# Patient Record
Sex: FEMALE | Race: BLACK OR AFRICAN AMERICAN | NOT HISPANIC OR LATINO | Employment: OTHER | URBAN - METROPOLITAN AREA
[De-identification: names, ages, dates, MRNs, and addresses within clinical notes are randomized per-mention and may not be internally consistent; named-entity substitution may affect disease eponyms.]

---

## 2020-08-20 ENCOUNTER — TELEPHONE (OUTPATIENT)
Dept: HEMATOLOGY/ONCOLOGY | Facility: CLINIC | Age: 80
End: 2020-08-20

## 2020-08-24 NOTE — TELEPHONE ENCOUNTER
Patient's daughter had requested assistance in transferring mother's care from [place] to [place]. Mother will be moving in with daughter Karina, and is currently receiving Opdivo for squamous cell lung cancer at [facility].     Working on record retrieval, clinic notes obtained from Dr. Collier's office but all path, radiology are at [facility] and [facility], which has separate medical record dept. Request sent to these locations for stat receipt of records.     Per Karina patient has medicare but lost her insurance card. Utilized social security number to determine that patient's medicare plan is specific to [place], will not be accepted here. Spoke to [place] medicare office who states she can transfer care to another [place]-accepted medicare program, and she will be automatically de-enrolled from [place]'s program. Per social work, best contact to assist with this is Radha Savage at [facility] office. Left Radha a message requesting call back.     Called Karina and reported all of the above and what we are waiting on regarding her mother's care. Karina has returned to [place] and her son is staying with patient in [place] until she can come here. She has my direct number, encouraged her to call any time to check in but that I would be in touch as soon as possible.

## 2020-08-27 ENCOUNTER — TELEPHONE (OUTPATIENT)
Dept: HEMATOLOGY/ONCOLOGY | Facility: CLINIC | Age: 80
End: 2020-08-27

## 2020-08-27 NOTE — TELEPHONE ENCOUNTER
Called patient's daughter after speaking with Radha at the [facility] about insurance needs. Per Radha daughter can call her and she will connect her with an agent that can help set her up with appropriate insurance. Left my number and encouraged her to call me back to discuss.     Also emailed daughter Radha's info and my contact info.

## 2020-09-16 ENCOUNTER — TELEPHONE (OUTPATIENT)
Dept: HEMATOLOGY/ONCOLOGY | Facility: CLINIC | Age: 80
End: 2020-09-16

## 2020-09-16 NOTE — TELEPHONE ENCOUNTER
Called patient's daughter to update on options for care now that patient is in [place]. They are working on [employer] Medicare, but patient has been recommended to go into hospice by previous treating physician, and they would like to speak to a physician about this. Dr. Collins and I discussed chart, he is willing to see patient and discuss. Scheduled for tomorrow, uploaded ER admission records into media and walked images to radiology for review.     Discussed with daughter that, if hospice is recommended, we can set it up and they will help patient get enrolled in Medicare, although this will likely be delayed until the 1st of the month. They are willing to do whatever is necessary. She will call me if they have any issues tomorrow.

## 2020-09-16 NOTE — TELEPHONE ENCOUNTER
----- Message from William Collins MD sent at 9/16/2020  1:29 PM CDT -----  Regarding: RE: advice on unique patient situation  I would be happy to see her.  Do we have records uploaded?  We also have some second line trials or are they firm on hospice?      -E  ----- Message -----  From: Renetta Vicente, RN  Sent: 9/16/2020   1:22 PM CDT  To: William Collins MD  Subject: advice on unique patient situation               Hi Dr. Collins,     I have been helping the daughter of a patient that is interested in seeing someone at [facility]. Her mother (Ms. 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 She has been treated at [facility] in [place] since 2006 for this. She tried carbo/taxol, failed due to toxicity, and went onto Nivolumab only.     She was just admitted through the ER for hemoptysis/progression and told that she needs to proceed with hospice. She then traveled to [place] to live with her daughter. They are ok with hospice I think, but need someone in [place] to write the order, since she has not established care here. I've been speaking to the social work team and they are working on getting [place] Medicare (currently has a strange [place]-specific Medicare).     Since it would likely only be one consult, then an order for hospice, I didn't want to proceed without discussing with you. I have tried to compile her records, [place] isn't very responsive currently, have limited staff in medical records due to COVID. I just walked some of her images over to be uploaded and everything I've gotten has been scanned into media.   Would you be ok seeing her? If we haven't gotten insurance straight they'd probably pay out of pocket for the visit, then the hospice would expedite the medicare.     Thanks in advance for your help!

## 2020-09-17 ENCOUNTER — OFFICE VISIT (OUTPATIENT)
Dept: HEMATOLOGY/ONCOLOGY | Facility: CLINIC | Age: 80
End: 2020-09-17
Payer: MEDICARE

## 2020-09-17 ENCOUNTER — INFUSION (OUTPATIENT)
Dept: INFUSION THERAPY | Facility: HOSPITAL | Age: 80
End: 2020-09-17
Payer: MEDICARE

## 2020-09-17 VITALS
HEART RATE: 124 BPM | DIASTOLIC BLOOD PRESSURE: 46 MMHG | SYSTOLIC BLOOD PRESSURE: 73 MMHG | HEIGHT: 61 IN | OXYGEN SATURATION: 97 % | TEMPERATURE: 98 F | RESPIRATION RATE: 21 BRPM | BODY MASS INDEX: 13.65 KG/M2 | WEIGHT: 72.31 LBS

## 2020-09-17 VITALS
DIASTOLIC BLOOD PRESSURE: 42 MMHG | HEART RATE: 114 BPM | RESPIRATION RATE: 18 BRPM | WEIGHT: 72.31 LBS | TEMPERATURE: 98 F | SYSTOLIC BLOOD PRESSURE: 80 MMHG | HEIGHT: 61 IN | BODY MASS INDEX: 13.65 KG/M2

## 2020-09-17 DIAGNOSIS — E86.0 DEHYDRATION: Primary | ICD-10-CM

## 2020-09-17 DIAGNOSIS — C34.11 SQUAMOUS CELL CARCINOMA OF BRONCHUS IN RIGHT UPPER LOBE: Primary | ICD-10-CM

## 2020-09-17 DIAGNOSIS — E86.0 DEHYDRATION: ICD-10-CM

## 2020-09-17 DIAGNOSIS — R64 CACHEXIA: ICD-10-CM

## 2020-09-17 DIAGNOSIS — E43 SEVERE PROTEIN-CALORIE MALNUTRITION: ICD-10-CM

## 2020-09-17 DIAGNOSIS — E86.1 HYPOTENSION DUE TO HYPOVOLEMIA: ICD-10-CM

## 2020-09-17 PROCEDURE — 99497 ADVNCD CARE PLAN 30 MIN: CPT | Mod: S$GLB,,, | Performed by: STUDENT IN AN ORGANIZED HEALTH CARE EDUCATION/TRAINING PROGRAM

## 2020-09-17 PROCEDURE — 99205 OFFICE O/P NEW HI 60 MIN: CPT | Mod: S$GLB,,, | Performed by: STUDENT IN AN ORGANIZED HEALTH CARE EDUCATION/TRAINING PROGRAM

## 2020-09-17 PROCEDURE — 96360 HYDRATION IV INFUSION INIT: CPT

## 2020-09-17 PROCEDURE — 99999 PR PBB SHADOW E&M-EST. PATIENT-LVL V: ICD-10-PCS | Mod: PBBFAC,,, | Performed by: STUDENT IN AN ORGANIZED HEALTH CARE EDUCATION/TRAINING PROGRAM

## 2020-09-17 PROCEDURE — 25000003 PHARM REV CODE 250: Performed by: STUDENT IN AN ORGANIZED HEALTH CARE EDUCATION/TRAINING PROGRAM

## 2020-09-17 PROCEDURE — 99205 PR OFFICE/OUTPT VISIT, NEW, LEVL V, 60-74 MIN: ICD-10-PCS | Mod: S$GLB,,, | Performed by: STUDENT IN AN ORGANIZED HEALTH CARE EDUCATION/TRAINING PROGRAM

## 2020-09-17 PROCEDURE — 99497 PR ADVNCD CARE PLAN 30 MIN: ICD-10-PCS | Mod: S$GLB,,, | Performed by: STUDENT IN AN ORGANIZED HEALTH CARE EDUCATION/TRAINING PROGRAM

## 2020-09-17 PROCEDURE — 99999 PR PBB SHADOW E&M-EST. PATIENT-LVL V: CPT | Mod: PBBFAC,,, | Performed by: STUDENT IN AN ORGANIZED HEALTH CARE EDUCATION/TRAINING PROGRAM

## 2020-09-17 RX ORDER — HYDROCODONE BITARTRATE AND ACETAMINOPHEN 5; 325 MG/1; MG/1
1 TABLET ORAL EVERY 12 HOURS PRN
Qty: 30 TABLET | Refills: 0 | Status: SHIPPED | OUTPATIENT
Start: 2020-09-17

## 2020-09-17 RX ORDER — SODIUM CHLORIDE 0.9 % (FLUSH) 0.9 %
10 SYRINGE (ML) INJECTION
Status: DISCONTINUED | OUTPATIENT
Start: 2020-09-17 | End: 2020-09-17 | Stop reason: HOSPADM

## 2020-09-17 RX ORDER — DRONABINOL 5 MG/1
5 CAPSULE ORAL
Qty: 60 CAPSULE | Refills: 1 | Status: SHIPPED | OUTPATIENT
Start: 2020-09-17

## 2020-09-17 RX ORDER — HEPARIN 100 UNIT/ML
500 SYRINGE INTRAVENOUS
Status: DISCONTINUED | OUTPATIENT
Start: 2020-09-17 | End: 2020-09-17 | Stop reason: HOSPADM

## 2020-09-17 RX ORDER — SODIUM CHLORIDE 0.9 % (FLUSH) 0.9 %
10 SYRINGE (ML) INJECTION
Status: CANCELLED | OUTPATIENT
Start: 2020-09-17

## 2020-09-17 RX ORDER — HYDROCODONE BITARTRATE AND ACETAMINOPHEN 5; 325 MG/1; MG/1
1 TABLET ORAL EVERY 6 HOURS PRN
COMMUNITY
End: 2020-09-17 | Stop reason: SDUPTHER

## 2020-09-17 RX ORDER — DRONABINOL 5 MG/1
5 CAPSULE ORAL
Qty: 60 CAPSULE | Refills: 1 | Status: SHIPPED | OUTPATIENT
Start: 2020-09-17 | End: 2020-09-17

## 2020-09-17 RX ORDER — HEPARIN 100 UNIT/ML
500 SYRINGE INTRAVENOUS
Status: CANCELLED | OUTPATIENT
Start: 2020-09-17

## 2020-09-17 RX ORDER — MIRTAZAPINE 15 MG/1
15 TABLET, ORALLY DISINTEGRATING ORAL NIGHTLY
Qty: 30 TABLET | Refills: 2 | Status: SHIPPED | OUTPATIENT
Start: 2020-09-17

## 2020-09-17 RX ADMIN — SODIUM CHLORIDE 1000 ML: 0.9 INJECTION, SOLUTION INTRAVENOUS at 03:09

## 2020-09-17 NOTE — ASSESSMENT & PLAN NOTE
Patient with orthostatic symptoms and hypotensive in a seated position.  This is likely due to anorexia secondary to her lung cancer.  -Will arrange for IV fluids to improve her symptoms

## 2020-09-17 NOTE — ASSESSMENT & PLAN NOTE
Severe protein calorie malnutrition with BMI <16.  She is not taking any appetite stimulating agents at this time.  -Will start her on marinol and mirtazapine.

## 2020-09-17 NOTE — PROGRESS NOTES
PATIENT: Emily Cheng  MRN: [id number]  DATE: 9/17/2020      Diagnosis:   1. 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 Cachexia    3. Dehydration        Chief Complaint: Lung Cancer      Oncologic History:    Oncologic History 1. Squamous cell carcinoma right upper lobe    Oncologic Treatment 1. Chemoradiation 2006  2. Carboplatin/paclitaxel 2016 with CT  3. Carboplatin/paclitaxel 2019 with CT, discontinued due to QUIN  4. Nivolumab    Pathology 1. Squamous cell carcinoma right bronchus 2016        Subjective:    Interval History: Ms. Cheng is here for new patient consultation.    From review of OSH notes, history of T2N2/Stage IIIA right hilar non-small cell lung cancer for which she received chemoradiation (carboplatin/paclitaxel x4 cycles 8/2006-11/2006; XRT completed 1/2007).  Had locally recurrent disease in the right bronchus diagnosed by EBUS 2/15/2016 as squamous cell carcinoma, PDL1 0%.  It is reported she had metastasis to mediastinal nodes and also had indeterminate lung nodules although biopsy of the right paratracheal node was negative.  She was not considered to be a radiation candidate and surgery was not advised.  She subsequently received carboplatin/paclitaxel 3/24/2016-7/18/2016 with partial response.  Cycle 6 was dose reduced for QUIN of dizziness, fatigue, and peripheral neuropathy, which resolved.  She restarted chemotherapy 3/19/2019-7/8/2019 (carboplatin/paclitaxel again) when she had symptoms of progression.  The cancer still appeared to be sensitive with radiographic response (CT).  She was able to continue chemotherapy with dose reductions to adjust for side effects up to 9 cycles.  She was then started on nivolumab 2/18/2020 with CT scan from 4/2020 showing stable disease.  3/9/2020 PET CT shows abnormal uptake in right upper lobe hilar mass extending to the mediastinum.  No grossly obvious uptake suggestive of metastatic disease elsewhere.  Uptake noted in the lumbar  sacral junction favoring inflammatory processes.  4/1/2020 MRI spine shows no evidence of metastatic disease in the lumbar spine.  L5-S1 degenerative disk disease and spinal stenosis was identified.    Personally reviewed CT images that have been uploaded.  Clear progression between 10/2019 and 1/2020, but it also appears that there is a FL seen when comparing 1/2020 and 4/2020 scans.    She presented with hemoptysis 9/2020 with decline in hemoglobin from 10 to 8.  Was also treated empirically for possible pneumonia with doxycycline.  CT imaging in the 9/2020 visit (media tab) reports progression of disease in the right upper lobe.    She is accompanied by her daughter, also her HCP, today.    Since arriving in [place], the hemoptysis has declined a lot.  She has small little clots <1cm maybe once or twice in a day but it is not worsening.  Denies bleeding elsewhere.  Overall she has shortness of breath, especially with exertion, and spends most of the day seated.  Her family assists her with most activities.  She can use a cane to ambulate but admits she cannot walk very far.    She has very low appetite and maybe eats a bite of food a few times a day at most.  She denies any nausea but sometimes gets heartburn after eating.  She does not report much pain in her chest but it is well controlled with 1 tablet of norco per day.  She also has constipation but denies abdominal pain.      Past Medical History:   Past Medical History:   Diagnosis Date    Lung cancer        Past Surgical HIstory: History reviewed. No pertinent surgical history.    Family History: History reviewed. No pertinent family history.    Social History:  reports that she has quit smoking. She quit after 1.50 years of use. She has never used smokeless tobacco. She reports previous alcohol use. She reports that she does not use drugs.    Allergies:  Review of patient's allergies indicates:  No Known Allergies    Medications:  Current Outpatient  "Medications   Medication Sig Dispense Refill    dronabinoL (MARINOL) 5 MG capsule Take 1 capsule (5 mg total) by mouth 2 (two) times daily before meals. 60 capsule 1    HYDROcodone-acetaminophen (NORCO) 5-325 mg per tablet Take 1 tablet by mouth every 12 (twelve) hours as needed for Pain. 30 tablet 0    mirtazapine (REMERON SOL-TAB) 15 MG disintegrating tablet Take 1 tablet (15 mg total) by mouth nightly. 30 tablet 2     No current facility-administered medications for this visit.        Review of Systems   Constitutional: Positive for activity change, appetite change, fatigue and unexpected weight change. Negative for chills, diaphoresis and fever.   HENT: Negative for mouth sores, nosebleeds, sore throat and trouble swallowing.    Eyes: Negative for visual disturbance.   Respiratory: Positive for cough and shortness of breath. Negative for chest tightness and wheezing.    Cardiovascular: Negative for chest pain and leg swelling.   Gastrointestinal: Positive for constipation. Negative for abdominal distention, abdominal pain, blood in stool, diarrhea, nausea and vomiting.   Endocrine: Negative for cold intolerance and heat intolerance.   Genitourinary: Negative for difficulty urinating and dysuria.   Musculoskeletal: Negative for arthralgias and back pain.   Skin: Negative for color change.   Neurological: Positive for dizziness (when she stands up) and weakness. Negative for light-headedness, numbness and headaches.   Hematological: Negative for adenopathy. Does not bruise/bleed easily.   Psychiatric/Behavioral: Negative for confusion.       ECOG Performance Status: 3   Objective:      Vitals:   Vitals:    09/17/20 1415   BP: (!) 73/46   Pulse: (!) 124   Resp: (!) 21   Temp: 97.6 °F (36.4 °C)   TempSrc: Oral   SpO2: 97%   Weight: 32.8 kg (72 lb 5 oz)   Height: 5' 1.3" (1.557 m)     BMI: Body mass index is 13.53 kg/m².    Physical Exam  Constitutional:       General: She is not in acute distress.     Appearance: " She is ill-appearing.      Comments: cachectic   HENT:      Head: Normocephalic and atraumatic.      Mouth/Throat:      Mouth: Mucous membranes are dry.      Pharynx: No oropharyngeal exudate or posterior oropharyngeal erythema.   Eyes:      General: No scleral icterus.     Extraocular Movements: Extraocular movements intact.      Conjunctiva/sclera: Conjunctivae normal.      Pupils: Pupils are equal, round, and reactive to light.   Neck:      Musculoskeletal: Normal range of motion and neck supple.   Cardiovascular:      Rate and Rhythm: Regular rhythm. Tachycardia present.      Heart sounds: No murmur. No friction rub. No gallop.    Pulmonary:      Effort: Pulmonary effort is normal. No respiratory distress.      Breath sounds: No stridor. No wheezing, rhonchi or rales.   Abdominal:      General: Bowel sounds are normal. There is no distension.      Palpations: Abdomen is soft. There is no mass.      Tenderness: There is no abdominal tenderness. There is no guarding or rebound.   Musculoskeletal: Normal range of motion.      Right lower leg: No edema.      Left lower leg: No edema.   Skin:     General: Skin is warm and dry.      Coloration: Skin is pale.   Neurological:      General: No focal deficit present.      Mental Status: She is alert.         Laboratory Data: 8/17/2020  WBC 10k, Hg 11, MCV 91, Plt 432k, TBili 0.9, K 4.7, Na 137, Ca 9.8, TSH 1.5, fT4 1.0    Imaging:    Assessment:       1. 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 Cachexia    3. Dehydration           Plan:       Problem List Items Addressed This Visit        Renal/    Dehydration    Current Assessment & Plan     Patient with orthostatic symptoms and hypotensive in a seated position.  This is likely due to anorexia secondary to her lung cancer.  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    Had locally recurrent disease in the right bronchus diagnosed by EBUS 2/15/2016 as squamous cell carcinoma, PDL1 0%.    She subsequently received carboplatin/paclitaxel 3/24/2016-7/18/2016 with partial response.  She restarted chemotherapy 3/19/2019-7/8/2019 (carboplatin/paclitaxel again) for when she had symptoms of progression.  Doses had to be reduced with therapy due to cumulative ADEs.  She was then started on nivolumab 2/18/2020 with CT scan from 4/2020 showing stable disease.  Last dose 7/2020.         Current Assessment & Plan     Review of OSH visit 9/2020 has a report of CT scan showing progression of disease in the right upper lobe (see media tab). These scans are not available for personal review.  Patient has severe cachexia and protein calorie malnutrition as demonstrated with her BMI <16. She is hypotensive and tachycardic and has a poor performance status (ECOG 3-4).    -Long discussion with patient and daughter regarding poor condition at this time.  Risks of 4th line chemotherapy for her outweigh any benefits.  Recommended palliative care and hospice referrals to which they were agreeable to.  -Discussed advanced care planning and given her current circumstance recommended DNR/DNI as it is unlikely she would ever be in a condition to receive chemo if she did survive such an event.  -Advised that if the patient has recurrent and worsening hemoptysis, she can notify me so we can get a palliative referral to rad onc who may be able to use radiation to stop bleeding.         Relevant Medications    HYDROcodone-acetaminophen (NORCO) 5-325 mg per tablet    Other Relevant Orders    Ambulatory referral/consult to Palliative Care    Ambulatory referral/consult to Hospice       Endocrine    Severe protein-calorie  malnutrition    Current Assessment & Plan     Severe protein calorie malnutrition with BMI <16.  She is not taking any appetite stimulating agents at this time.  -Will start her on marinol and mirtazapine.            Other    Cachexia    Current Assessment & Plan     See severe protein calorie malnutrition         Relevant Medications    mirtazapine (REMERON SOL-TAB) 15 MG disintegrating tablet    dronabinoL (MARINOL) 5 MG capsule    Other Relevant Orders    Ambulatory referral/consult to Palliative Care    Ambulatory referral/consult to Hospice    Hypotension due to hypovolemia    Current Assessment & Plan     See dehydration             Advance Care Planning I initiated the process of advance care planning today and explained the importance of this process to the patient.  Then the patient received detailed information about the importance of designating a Health Care Power of  (HCPOA). she was instructed to communicate with this person about their wishes for future healthcare, should she become sick and lose decision-making capacity. The patient has previously appointed a HCPOA. After our discussion, the patient has decided to complete a HCPOA.  I spent a total time of 65 minutes discussing this in addition to recommendations for palliative care referral and hospice with the patient.       Orders Placed This Encounter   Procedures    Ambulatory referral/consult to Palliative Care    Ambulatory referral/consult to Hospice           William Collins MD  Hematology Oncology

## 2020-09-17 NOTE — ASSESSMENT & PLAN NOTE
Review of OSH visit 9/2020 has a report of CT scan showing progression of disease in the right upper lobe (see media tab). These scans are not available for personal review.  Patient has severe cachexia and protein calorie malnutrition as demonstrated with her BMI <16. She is hypotensive and tachycardic and has a poor performance status (ECOG 3-4).    -Long discussion with patient and daughter regarding poor condition at this time.  Risks of 4th line chemotherapy for her outweigh any benefits.  Recommended palliative care and hospice referrals to which they were agreeable to.  -Discussed advanced care planning and given her current circumstance recommended DNR/DNI as it is unlikely she would ever be in a condition to receive chemo if she did survive such an event.  -Advised that if the patient has recurrent and worsening hemoptysis, she can notify me so we can get a palliative referral to rad onc who may be able to use radiation to stop bleeding.

## 2020-09-17 NOTE — LETTER
September 17, 2020      [facility]  [address] [place] [address]           [facility] - Hem Onc 3rd Fl  [address]  [facility] [address]  Phone: [phone]          Patient: Emily Chneg   MR Number: [id number]   YOB: 1940   Date of Visit: 9/17/2020       Dear [facility]:    Thank you for referring Emily Cheng to me for evaluation. Attached you will find relevant portions of my assessment and plan of care.    If you have questions, please do not hesitate to call me. I look forward to following Emily Cheng along with you.    Sincerely,    William Collins MD    Enclosure  CC:  No Recipients    If you would like to receive this communication electronically, please contact [email or web address] or [phone] to request more information on [facility] access.    For providers and/or their staff who would like to refer a patient to [facility], please contact us through our one-stop-shop provider referral line, Feng Whittaker, at [phone].    If you feel you have received this communication in error or would no longer like to receive these types of communications, please e-mail [email or web address]

## 2020-09-17 NOTE — PLAN OF CARE
1535 pt here for 1 liter normal saline, hx, meds, allergies reviewed, pt with no complaints ,pt reclined in chair, warm blankets provided, continue to monitor

## 2020-09-18 ENCOUNTER — DOCUMENTATION ONLY (OUTPATIENT)
Dept: HEMATOLOGY/ONCOLOGY | Facility: CLINIC | Age: 80
End: 2020-09-18

## 2020-09-18 NOTE — PROGRESS NOTES
Received referral from clinic that patient was ready to transition to hospice care. Reached out to daughter, Karina, [phone], to discuss. Introduced self and my role. Discussed the referral to hospice. She said she would like the time to talk with others to get names of agencies. I discussed with her the resource located on Medicare.gov. I provided her with my direct number for any further questions as well as to let me know if she selects agencies. I did explain to her about informational visits that can be provided as well.

## 2020-09-22 ENCOUNTER — TELEPHONE (OUTPATIENT)
Dept: PALLIATIVE MEDICINE | Facility: CLINIC | Age: 80
End: 2020-09-22

## 2020-09-22 NOTE — TELEPHONE ENCOUNTER
Spoke with daughter Karina and discussed difference between palliative care and hospice. She would like help setting up hospice services for her mom. She would like assistance choosing agency as she does not have time to research different agencies. Explained referral process and sent referral to [facility] hospice.

## 2020-09-23 ENCOUNTER — DOCUMENTATION ONLY (OUTPATIENT)
Dept: HEMATOLOGY/ONCOLOGY | Facility: CLINIC | Age: 80
End: 2020-09-23

## 2020-09-23 NOTE — PROGRESS NOTES
Called to follow up with daughter about referral to hospice. She stated that she looked through Medicare.gov and selected [facility]. I reached out to [facility] to confirm and left message for Brian.

## 2020-09-23 NOTE — PROGRESS NOTES
Received call back from Brian with [facility]. She stated patient was to be admitted today. They received the referral from the palliative medicine department.

## 2020-11-06 ENCOUNTER — TELEPHONE (OUTPATIENT)
Dept: HEMATOLOGY/ONCOLOGY | Facility: CLINIC | Age: 80
End: 2020-11-06

## 2020-11-06 NOTE — TELEPHONE ENCOUNTER
----- Message from Amos Martinez sent at 11/6/2020 10:24 AM CST -----  Contact: [facility]  Patient Advice/Staff Message     Caller name/title: Marlyn     Provider: Dennis    Reason for call: Calling to report the pt passed on 11/05 at 8:40am     Do you feel you need to be seen today:: N/a        Communication Preference: [phone]    Additional Information:
